# Patient Record
Sex: FEMALE | Race: BLACK OR AFRICAN AMERICAN | NOT HISPANIC OR LATINO | URBAN - METROPOLITAN AREA
[De-identification: names, ages, dates, MRNs, and addresses within clinical notes are randomized per-mention and may not be internally consistent; named-entity substitution may affect disease eponyms.]

---
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
## 2024-08-26 NOTE — ED PEDIATRIC NURSE REASSESSMENT NOTE - NS ED NURSE REASSESS COMMENT FT2
[FreeTextEntry1] : CARLOS MACK is a 43 year who returns to follow up for dystonia and CM  last visit was 2024 to start botox  since last visit good benefit from botox for dystonia but wants higher dose as it wore off a few weeks ago  migraines have been a bit worse with the recent weather  she had to stop her vyvanse and duloxetine due to insurance aurthizatino issues so now is off.  has been much worse from memory perspective. eventually did start vyvanse and planning to start duloxetine soon.  father recently    Pt resting comfortably in bed with mother and US tech at bedside. Pt ambulating and voiding freely, in no apparent distress at this time, well appearing. US appy in progress, transvag US pending, mother aware. Will cont to monitor closely.